# Patient Record
Sex: FEMALE | Race: WHITE | NOT HISPANIC OR LATINO | URBAN - METROPOLITAN AREA
[De-identification: names, ages, dates, MRNs, and addresses within clinical notes are randomized per-mention and may not be internally consistent; named-entity substitution may affect disease eponyms.]

---

## 2018-02-03 ENCOUNTER — EMERGENCY (EMERGENCY)
Facility: HOSPITAL | Age: 28
LOS: 1 days | Discharge: HOME | End: 2018-02-03
Attending: EMERGENCY MEDICINE | Admitting: EMERGENCY MEDICINE
Payer: COMMERCIAL

## 2018-02-03 VITALS
DIASTOLIC BLOOD PRESSURE: 68 MMHG | OXYGEN SATURATION: 100 % | HEART RATE: 82 BPM | WEIGHT: 145.06 LBS | SYSTOLIC BLOOD PRESSURE: 122 MMHG | TEMPERATURE: 97 F | RESPIRATION RATE: 17 BRPM

## 2018-02-03 DIAGNOSIS — F10.120 ALCOHOL ABUSE WITH INTOXICATION, UNCOMPLICATED: ICD-10-CM

## 2018-02-03 DIAGNOSIS — K52.9 NONINFECTIVE GASTROENTERITIS AND COLITIS, UNSPECIFIED: ICD-10-CM

## 2018-02-03 DIAGNOSIS — R11.10 VOMITING, UNSPECIFIED: ICD-10-CM

## 2018-02-03 DIAGNOSIS — Z79.899 OTHER LONG TERM (CURRENT) DRUG THERAPY: ICD-10-CM

## 2018-02-03 DIAGNOSIS — Z79.84 LONG TERM (CURRENT) USE OF ORAL HYPOGLYCEMIC DRUGS: ICD-10-CM

## 2018-02-03 LAB
ALBUMIN SERPL ELPH-MCNC: 4.5 G/DL — SIGNIFICANT CHANGE UP (ref 3.4–5)
ALP SERPL-CCNC: 73 U/L — SIGNIFICANT CHANGE UP (ref 40–120)
ALT FLD-CCNC: 23 U/L — SIGNIFICANT CHANGE UP (ref 12–42)
AMYLASE P1 CFR SERPL: 42 U/L — SIGNIFICANT CHANGE UP (ref 25–115)
ANION GAP SERPL CALC-SCNC: 15 MMOL/L — SIGNIFICANT CHANGE UP (ref 9–16)
APPEARANCE UR: CLEAR — SIGNIFICANT CHANGE UP
AST SERPL-CCNC: 20 U/L — SIGNIFICANT CHANGE UP (ref 15–37)
BACTERIA # UR AUTO: PRESENT /HPF
BILIRUB SERPL-MCNC: 2.3 MG/DL — HIGH (ref 0.2–1.2)
BILIRUB UR-MCNC: NEGATIVE — SIGNIFICANT CHANGE UP
BUN SERPL-MCNC: 15 MG/DL — SIGNIFICANT CHANGE UP (ref 7–23)
CALCIUM SERPL-MCNC: 9.6 MG/DL — SIGNIFICANT CHANGE UP (ref 8.5–10.5)
CHLORIDE SERPL-SCNC: 106 MMOL/L — SIGNIFICANT CHANGE UP (ref 96–108)
CO2 SERPL-SCNC: 23 MMOL/L — SIGNIFICANT CHANGE UP (ref 22–31)
COLOR SPEC: YELLOW — SIGNIFICANT CHANGE UP
CREAT SERPL-MCNC: 0.73 MG/DL — SIGNIFICANT CHANGE UP (ref 0.5–1.3)
DIFF PNL FLD: (no result)
EPI CELLS # UR: SIGNIFICANT CHANGE UP /HPF
ETHANOL SERPL-MCNC: 119 MG/DL — HIGH
GLUCOSE SERPL-MCNC: 91 MG/DL — SIGNIFICANT CHANGE UP (ref 70–99)
GLUCOSE UR QL: NEGATIVE — SIGNIFICANT CHANGE UP
HCG UR QL: NEGATIVE — SIGNIFICANT CHANGE UP
HCT VFR BLD CALC: 39.3 % — SIGNIFICANT CHANGE UP (ref 34.5–45)
HGB BLD-MCNC: 14 G/DL — SIGNIFICANT CHANGE UP (ref 11.5–15.5)
KETONES UR-MCNC: NEGATIVE — SIGNIFICANT CHANGE UP
LEUKOCYTE ESTERASE UR-ACNC: NEGATIVE — SIGNIFICANT CHANGE UP
LIDOCAIN IGE QN: 82 U/L — SIGNIFICANT CHANGE UP (ref 73–393)
MCHC RBC-ENTMCNC: 31.7 PG — SIGNIFICANT CHANGE UP (ref 27–34)
MCHC RBC-ENTMCNC: 35.6 G/DL — SIGNIFICANT CHANGE UP (ref 32–36)
MCV RBC AUTO: 89.1 FL — SIGNIFICANT CHANGE UP (ref 80–100)
NITRITE UR-MCNC: NEGATIVE — SIGNIFICANT CHANGE UP
PH UR: 5.5 — SIGNIFICANT CHANGE UP (ref 5–8)
PLATELET # BLD AUTO: 260 K/UL — SIGNIFICANT CHANGE UP (ref 150–400)
POTASSIUM SERPL-MCNC: 3.6 MMOL/L — SIGNIFICANT CHANGE UP (ref 3.5–5.3)
POTASSIUM SERPL-SCNC: 3.6 MMOL/L — SIGNIFICANT CHANGE UP (ref 3.5–5.3)
PROT SERPL-MCNC: 7.9 G/DL — SIGNIFICANT CHANGE UP (ref 6.4–8.2)
PROT UR-MCNC: NEGATIVE MG/DL — SIGNIFICANT CHANGE UP
RBC # BLD: 4.41 M/UL — SIGNIFICANT CHANGE UP (ref 3.8–5.2)
RBC # FLD: 12.3 % — SIGNIFICANT CHANGE UP (ref 10.3–16.9)
RBC CASTS # UR COMP ASSIST: (no result) /HPF
SODIUM SERPL-SCNC: 144 MMOL/L — SIGNIFICANT CHANGE UP (ref 132–145)
SP GR SPEC: 1.02 — SIGNIFICANT CHANGE UP (ref 1–1.03)
UROBILINOGEN FLD QL: 0.2 E.U./DL — SIGNIFICANT CHANGE UP
WBC # BLD: 5.3 K/UL — SIGNIFICANT CHANGE UP (ref 3.8–10.5)
WBC # FLD AUTO: 5.3 K/UL — SIGNIFICANT CHANGE UP (ref 3.8–10.5)
WBC UR QL: < 5 /HPF — SIGNIFICANT CHANGE UP

## 2018-02-03 PROCEDURE — 99284 EMERGENCY DEPT VISIT MOD MDM: CPT | Mod: 25

## 2018-02-03 RX ORDER — FAMOTIDINE 10 MG/ML
20 INJECTION INTRAVENOUS ONCE
Qty: 0 | Refills: 0 | Status: COMPLETED | OUTPATIENT
Start: 2018-02-03 | End: 2018-02-03

## 2018-02-03 RX ORDER — SODIUM CHLORIDE 9 MG/ML
1000 INJECTION INTRAMUSCULAR; INTRAVENOUS; SUBCUTANEOUS ONCE
Qty: 0 | Refills: 0 | Status: COMPLETED | OUTPATIENT
Start: 2018-02-03 | End: 2018-02-03

## 2018-02-03 RX ORDER — ONDANSETRON 8 MG/1
4 TABLET, FILM COATED ORAL ONCE
Qty: 0 | Refills: 0 | Status: COMPLETED | OUTPATIENT
Start: 2018-02-03 | End: 2018-02-03

## 2018-02-03 RX ADMIN — SODIUM CHLORIDE 1000 MILLILITER(S): 9 INJECTION INTRAMUSCULAR; INTRAVENOUS; SUBCUTANEOUS at 23:33

## 2018-02-03 RX ADMIN — FAMOTIDINE 20 MILLIGRAM(S): 10 INJECTION INTRAVENOUS at 23:33

## 2018-02-03 RX ADMIN — ONDANSETRON 4 MILLIGRAM(S): 8 TABLET, FILM COATED ORAL at 23:33

## 2018-02-03 NOTE — ED ADULT TRIAGE NOTE - CHIEF COMPLAINT QUOTE
pt came in ambulatory, with complaint of persistent vomiting which started about 3 hours ago, also associated with nausea. Denies abd pain, diarrhea, constipation.

## 2018-02-04 VITALS
DIASTOLIC BLOOD PRESSURE: 59 MMHG | HEART RATE: 76 BPM | RESPIRATION RATE: 16 BRPM | SYSTOLIC BLOOD PRESSURE: 126 MMHG | TEMPERATURE: 98 F | OXYGEN SATURATION: 100 %

## 2018-02-04 RX ORDER — ONDANSETRON 8 MG/1
1 TABLET, FILM COATED ORAL
Qty: 12 | Refills: 0
Start: 2018-02-04 | End: 2018-02-06

## 2018-02-04 NOTE — ED ADULT NURSE NOTE - OBJECTIVE STATEMENT
c/p vomiting today after drinks 2 drinks at bar tonight; c/o lower abd pain this morning but none now

## 2018-02-04 NOTE — ED PROVIDER NOTE - CARE PLAN
Principal Discharge DX:	Gastroenteritis  Secondary Diagnosis:	Alcoholic intoxication without complication

## 2018-02-04 NOTE — ED PROVIDER NOTE - OBJECTIVE STATEMENT
28 yo female c/o sudden onset n/v while drinking alcohol at  bar. denies abd pain, c/o nausea currently. lmp 1 week ago.

## 2018-02-04 NOTE — ED ADULT NURSE NOTE - PATIENT DISCHARGE SIGNATURE
Detail Level: Simple Other (Free Text): Resolved with ln2 Note Text (......Xxx Chief Complaint.): This diagnosis correlates with the 04-Feb-2018

## 2018-04-15 ENCOUNTER — TRANSCRIPTION ENCOUNTER (OUTPATIENT)
Age: 28
End: 2018-04-15

## 2018-12-02 ENCOUNTER — EMERGENCY (EMERGENCY)
Facility: HOSPITAL | Age: 28
LOS: 1 days | Discharge: HOME | End: 2018-12-02
Attending: EMERGENCY MEDICINE | Admitting: EMERGENCY MEDICINE
Payer: COMMERCIAL

## 2018-12-02 VITALS
TEMPERATURE: 98 F | RESPIRATION RATE: 19 BRPM | OXYGEN SATURATION: 97 % | WEIGHT: 149.91 LBS | DIASTOLIC BLOOD PRESSURE: 90 MMHG | HEART RATE: 96 BPM | SYSTOLIC BLOOD PRESSURE: 144 MMHG

## 2018-12-02 VITALS
SYSTOLIC BLOOD PRESSURE: 120 MMHG | OXYGEN SATURATION: 99 % | TEMPERATURE: 98 F | RESPIRATION RATE: 14 BRPM | DIASTOLIC BLOOD PRESSURE: 68 MMHG | HEART RATE: 79 BPM

## 2018-12-02 LAB
ALBUMIN SERPL ELPH-MCNC: 4.7 G/DL — SIGNIFICANT CHANGE UP (ref 3.4–5)
ALP SERPL-CCNC: 77 U/L — SIGNIFICANT CHANGE UP (ref 40–120)
ALT FLD-CCNC: 30 U/L — SIGNIFICANT CHANGE UP (ref 12–42)
ANION GAP SERPL CALC-SCNC: 12 MMOL/L — SIGNIFICANT CHANGE UP (ref 9–16)
AST SERPL-CCNC: 35 U/L — SIGNIFICANT CHANGE UP (ref 15–37)
BASOPHILS NFR BLD AUTO: 0.3 % — SIGNIFICANT CHANGE UP (ref 0–2)
BILIRUB SERPL-MCNC: 2.4 MG/DL — HIGH (ref 0.2–1.2)
BUN SERPL-MCNC: 9 MG/DL — SIGNIFICANT CHANGE UP (ref 7–23)
CALCIUM SERPL-MCNC: 10 MG/DL — SIGNIFICANT CHANGE UP (ref 8.5–10.5)
CHLORIDE SERPL-SCNC: 101 MMOL/L — SIGNIFICANT CHANGE UP (ref 96–108)
CO2 SERPL-SCNC: 26 MMOL/L — SIGNIFICANT CHANGE UP (ref 22–31)
CREAT SERPL-MCNC: 0.83 MG/DL — SIGNIFICANT CHANGE UP (ref 0.5–1.3)
EOSINOPHIL NFR BLD AUTO: 0.7 % — SIGNIFICANT CHANGE UP (ref 0–6)
GLUCOSE SERPL-MCNC: 96 MG/DL — SIGNIFICANT CHANGE UP (ref 70–99)
HCT VFR BLD CALC: 39.7 % — SIGNIFICANT CHANGE UP (ref 34.5–45)
HGB BLD-MCNC: 14.8 G/DL — SIGNIFICANT CHANGE UP (ref 11.5–15.5)
IMM GRANULOCYTES NFR BLD AUTO: 0.3 % — SIGNIFICANT CHANGE UP (ref 0–1.5)
LYMPHOCYTES # BLD AUTO: 13.5 % — SIGNIFICANT CHANGE UP (ref 13–44)
MCHC RBC-ENTMCNC: 32.2 PG — SIGNIFICANT CHANGE UP (ref 27–34)
MCHC RBC-ENTMCNC: 37.3 G/DL — HIGH (ref 32–36)
MCV RBC AUTO: 86.3 FL — SIGNIFICANT CHANGE UP (ref 80–100)
MONOCYTES NFR BLD AUTO: 7.3 % — SIGNIFICANT CHANGE UP (ref 2–14)
NEUTROPHILS NFR BLD AUTO: 77.9 % — HIGH (ref 43–77)
PLATELET # BLD AUTO: 285 K/UL — SIGNIFICANT CHANGE UP (ref 150–400)
POTASSIUM SERPL-MCNC: 3.4 MMOL/L — LOW (ref 3.5–5.3)
POTASSIUM SERPL-SCNC: 3.4 MMOL/L — LOW (ref 3.5–5.3)
PROT SERPL-MCNC: 8.1 G/DL — SIGNIFICANT CHANGE UP (ref 6.4–8.2)
RBC # BLD: 4.6 M/UL — SIGNIFICANT CHANGE UP (ref 3.8–5.2)
RBC # FLD: 12.4 % — SIGNIFICANT CHANGE UP (ref 10.3–14.5)
SODIUM SERPL-SCNC: 139 MMOL/L — SIGNIFICANT CHANGE UP (ref 132–145)
WBC # BLD: 10.2 K/UL — SIGNIFICANT CHANGE UP (ref 3.8–10.5)
WBC # FLD AUTO: 10.2 K/UL — SIGNIFICANT CHANGE UP (ref 3.8–10.5)

## 2018-12-02 PROCEDURE — 99284 EMERGENCY DEPT VISIT MOD MDM: CPT

## 2018-12-02 RX ORDER — PHENAZOPYRIDINE HCL 100 MG
200 TABLET ORAL ONCE
Qty: 0 | Refills: 0 | Status: COMPLETED | OUTPATIENT
Start: 2018-12-02 | End: 2018-12-02

## 2018-12-02 RX ORDER — PHENAZOPYRIDINE HCL 100 MG
1 TABLET ORAL
Qty: 6 | Refills: 0 | OUTPATIENT
Start: 2018-12-02 | End: 2018-12-03

## 2018-12-02 RX ORDER — CEFTRIAXONE 500 MG/1
1 INJECTION, POWDER, FOR SOLUTION INTRAMUSCULAR; INTRAVENOUS ONCE
Qty: 0 | Refills: 0 | Status: COMPLETED | OUTPATIENT
Start: 2018-12-02 | End: 2018-12-02

## 2018-12-02 RX ORDER — SODIUM CHLORIDE 9 MG/ML
1000 INJECTION INTRAMUSCULAR; INTRAVENOUS; SUBCUTANEOUS ONCE
Qty: 0 | Refills: 0 | Status: COMPLETED | OUTPATIENT
Start: 2018-12-02 | End: 2018-12-02

## 2018-12-02 RX ORDER — CEFPODOXIME PROXETIL 100 MG
1 TABLET ORAL
Qty: 14 | Refills: 0
Start: 2018-12-02 | End: 2018-12-08

## 2018-12-02 RX ADMIN — Medication 200 MILLIGRAM(S): at 16:27

## 2018-12-02 RX ADMIN — CEFTRIAXONE 1 GRAM(S): 500 INJECTION, POWDER, FOR SOLUTION INTRAMUSCULAR; INTRAVENOUS at 17:16

## 2018-12-02 RX ADMIN — SODIUM CHLORIDE 2000 MILLILITER(S): 9 INJECTION INTRAMUSCULAR; INTRAVENOUS; SUBCUTANEOUS at 16:27

## 2018-12-02 RX ADMIN — CEFTRIAXONE 100 GRAM(S): 500 INJECTION, POWDER, FOR SOLUTION INTRAMUSCULAR; INTRAVENOUS at 17:16

## 2018-12-02 RX ADMIN — SODIUM CHLORIDE 1000 MILLILITER(S): 9 INJECTION INTRAMUSCULAR; INTRAVENOUS; SUBCUTANEOUS at 17:16

## 2018-12-02 NOTE — ED PROVIDER NOTE - PHYSICAL EXAMINATION
CON: ao x 3, HENMT: clear oropharynx, soft neck, HEAD: atraumatic, GI: +BS, soft, nontender, no rebound, no guarding, GUL no cvat, SKIN: no rash, MSK: no deformities, NEURO: no gross motor or sensory deficit

## 2018-12-04 ENCOUNTER — EMERGENCY (EMERGENCY)
Facility: HOSPITAL | Age: 28
LOS: 1 days | Discharge: HOME | End: 2018-12-04
Admitting: EMERGENCY MEDICINE
Payer: COMMERCIAL

## 2018-12-04 VITALS
SYSTOLIC BLOOD PRESSURE: 117 MMHG | OXYGEN SATURATION: 96 % | TEMPERATURE: 98 F | WEIGHT: 149.91 LBS | HEART RATE: 78 BPM | DIASTOLIC BLOOD PRESSURE: 81 MMHG | RESPIRATION RATE: 15 BRPM

## 2018-12-04 LAB
APPEARANCE UR: SIGNIFICANT CHANGE UP
BILIRUB UR-MCNC: NEGATIVE — SIGNIFICANT CHANGE UP
COLOR SPEC: ABNORMAL
DIFF PNL FLD: NEGATIVE — SIGNIFICANT CHANGE UP
GLUCOSE UR QL: 100
HCG UR QL: NEGATIVE — SIGNIFICANT CHANGE UP
KETONES UR-MCNC: NEGATIVE — SIGNIFICANT CHANGE UP
LEUKOCYTE ESTERASE UR-ACNC: ABNORMAL
NITRITE UR-MCNC: POSITIVE
PH UR: 5 — SIGNIFICANT CHANGE UP (ref 5–8)
PROT UR-MCNC: NEGATIVE MG/DL — SIGNIFICANT CHANGE UP
SP GR SPEC: >=1.03 — SIGNIFICANT CHANGE UP (ref 1–1.03)
UROBILINOGEN FLD QL: 1 E.U./DL — SIGNIFICANT CHANGE UP

## 2018-12-04 PROCEDURE — 99283 EMERGENCY DEPT VISIT LOW MDM: CPT

## 2018-12-04 RX ORDER — AZTREONAM 2 G
1 VIAL (EA) INJECTION
Qty: 14 | Refills: 0
Start: 2018-12-04 | End: 2018-12-10

## 2018-12-04 RX ADMIN — Medication 1 TABLET(S): at 20:00

## 2018-12-04 NOTE — ED PROVIDER NOTE - OBJECTIVE STATEMENT
27 y/o F p/w worsening UTI sx's for this past week. Was seen here 2 days ago for UTI and prescribed cefpodoxime however now has worsening dysuria, frequency and suprapubic pressure despite ABX. Slight back discomfort. Requesting different ABX. No f/c, weakness, N/V/D, abnormal vaginal bleeding or d/c.

## 2018-12-04 NOTE — ED PROVIDER NOTE - MEDICAL DECISION MAKING DETAILS
UA now has + Nitrites. No growth on previous urine Cx as of this time. Will prescribe Bactrim DS. F/U with PMD in 24-48 hours Strict return precautions reviewed with pt in which pt verbalizes understanding and agrees to.

## 2018-12-05 LAB
CULTURE RESULTS: NO GROWTH — SIGNIFICANT CHANGE UP
SPECIMEN SOURCE: SIGNIFICANT CHANGE UP

## 2018-12-06 DIAGNOSIS — Z79.899 OTHER LONG TERM (CURRENT) DRUG THERAPY: ICD-10-CM

## 2018-12-06 DIAGNOSIS — N30.00 ACUTE CYSTITIS WITHOUT HEMATURIA: ICD-10-CM

## 2018-12-06 DIAGNOSIS — R30.0 DYSURIA: ICD-10-CM

## 2018-12-06 DIAGNOSIS — Z79.84 LONG TERM (CURRENT) USE OF ORAL HYPOGLYCEMIC DRUGS: ICD-10-CM

## 2018-12-08 DIAGNOSIS — Z79.891 LONG TERM (CURRENT) USE OF OPIATE ANALGESIC: ICD-10-CM

## 2018-12-08 DIAGNOSIS — Z79.2 LONG TERM (CURRENT) USE OF ANTIBIOTICS: ICD-10-CM

## 2018-12-08 DIAGNOSIS — Z79.84 LONG TERM (CURRENT) USE OF ORAL HYPOGLYCEMIC DRUGS: ICD-10-CM

## 2018-12-08 DIAGNOSIS — Z79.899 OTHER LONG TERM (CURRENT) DRUG THERAPY: ICD-10-CM

## 2018-12-08 DIAGNOSIS — R30.0 DYSURIA: ICD-10-CM

## 2018-12-08 DIAGNOSIS — N39.0 URINARY TRACT INFECTION, SITE NOT SPECIFIED: ICD-10-CM

## 2019-03-01 ENCOUNTER — EMERGENCY (EMERGENCY)
Facility: HOSPITAL | Age: 29
LOS: 1 days | Discharge: HOME | End: 2019-03-01
Admitting: EMERGENCY MEDICINE
Payer: COMMERCIAL

## 2019-03-01 VITALS
DIASTOLIC BLOOD PRESSURE: 80 MMHG | RESPIRATION RATE: 18 BRPM | TEMPERATURE: 98 F | HEART RATE: 104 BPM | SYSTOLIC BLOOD PRESSURE: 148 MMHG | OXYGEN SATURATION: 99 %

## 2019-03-01 VITALS
HEART RATE: 76 BPM | SYSTOLIC BLOOD PRESSURE: 116 MMHG | OXYGEN SATURATION: 99 % | RESPIRATION RATE: 18 BRPM | DIASTOLIC BLOOD PRESSURE: 76 MMHG | TEMPERATURE: 99 F

## 2019-03-01 DIAGNOSIS — R30.0 DYSURIA: ICD-10-CM

## 2019-03-01 DIAGNOSIS — Z79.2 LONG TERM (CURRENT) USE OF ANTIBIOTICS: ICD-10-CM

## 2019-03-01 DIAGNOSIS — Z79.84 LONG TERM (CURRENT) USE OF ORAL HYPOGLYCEMIC DRUGS: ICD-10-CM

## 2019-03-01 DIAGNOSIS — Z79.899 OTHER LONG TERM (CURRENT) DRUG THERAPY: ICD-10-CM

## 2019-03-01 DIAGNOSIS — N30.00 ACUTE CYSTITIS WITHOUT HEMATURIA: ICD-10-CM

## 2019-03-01 LAB
APPEARANCE UR: CLEAR — SIGNIFICANT CHANGE UP
BILIRUB UR-MCNC: NEGATIVE — SIGNIFICANT CHANGE UP
COLOR SPEC: YELLOW — SIGNIFICANT CHANGE UP
DIFF PNL FLD: ABNORMAL
GLUCOSE UR QL: NEGATIVE — SIGNIFICANT CHANGE UP
HCG UR QL: NEGATIVE — SIGNIFICANT CHANGE UP
KETONES UR-MCNC: NEGATIVE — SIGNIFICANT CHANGE UP
LEUKOCYTE ESTERASE UR-ACNC: ABNORMAL
NITRITE UR-MCNC: NEGATIVE — SIGNIFICANT CHANGE UP
PH UR: 6 — SIGNIFICANT CHANGE UP (ref 5–8)
PROT UR-MCNC: NEGATIVE MG/DL — SIGNIFICANT CHANGE UP
SP GR SPEC: <=1.005 — SIGNIFICANT CHANGE UP (ref 1–1.03)
UROBILINOGEN FLD QL: 0.2 E.U./DL — SIGNIFICANT CHANGE UP

## 2019-03-01 PROCEDURE — 99283 EMERGENCY DEPT VISIT LOW MDM: CPT

## 2019-03-01 RX ORDER — IBUPROFEN 200 MG
600 TABLET ORAL ONCE
Qty: 0 | Refills: 0 | Status: COMPLETED | OUTPATIENT
Start: 2019-03-01 | End: 2019-03-01

## 2019-03-01 RX ORDER — PHENAZOPYRIDINE HCL 100 MG
1 TABLET ORAL
Qty: 10 | Refills: 0
Start: 2019-03-01 | End: 2019-03-03

## 2019-03-01 RX ORDER — CEFUROXIME AXETIL 250 MG
1 TABLET ORAL
Qty: 13 | Refills: 0
Start: 2019-03-01 | End: 2019-03-07

## 2019-03-01 RX ORDER — CEFUROXIME AXETIL 250 MG
250 TABLET ORAL ONCE
Qty: 0 | Refills: 0 | Status: COMPLETED | OUTPATIENT
Start: 2019-03-01 | End: 2019-03-01

## 2019-03-01 RX ORDER — PHENAZOPYRIDINE HCL 100 MG
200 TABLET ORAL ONCE
Qty: 0 | Refills: 0 | Status: COMPLETED | OUTPATIENT
Start: 2019-03-01 | End: 2019-03-01

## 2019-03-01 RX ADMIN — Medication 250 MILLIGRAM(S): at 22:09

## 2019-03-01 RX ADMIN — Medication 200 MILLIGRAM(S): at 20:32

## 2019-03-01 RX ADMIN — Medication 600 MILLIGRAM(S): at 20:32

## 2019-03-01 NOTE — ED PROVIDER NOTE - OBJECTIVE STATEMENT
27 y/o F presents to ED c/o dysuria and pressure x 3 hours pta.  Pt states her symptoms have been constant since onset and are very similar to past UTIs.  She denies fevers/chills, vomiting, abd pain, back pain, hematuria.  Pt states she took Azo, a natural urinary tract supplement and a left over dose of cephalexin 500 mg without relief.

## 2019-03-01 NOTE — ED PROVIDER NOTE - CARE PROVIDER_API CALL
Mukund Rodriguez)  Urology  [address] [place] [address], [address]  [place], [place] [id number]  Phone: [phone]  Fax: [phone]  Follow Up Time:

## 2019-03-01 NOTE — ED ADULT NURSE NOTE - OBJECTIVE STATEMENT
pt is a 27 y/o F who comes into the ed c/o sudden onset, severe Urinary symptoms since 5 pm. pt denies fevers, chills, nausea, vomiting. pt reports urinary frequency and pain with urination, pt reports she took an old antibiotic.

## 2019-03-01 NOTE — ED PROVIDER NOTE - CLINICAL SUMMARY MEDICAL DECISION MAKING FREE TEXT BOX
29 y/o F presents to ED c/o dysuria x 3 hours.  Pt well appearing. VSS. Pt given Pyridium and ibuprofen.  UA and urine culture sent. 27 y/o F presents to ED c/o dysuria x 3 hours.  Pt well appearing. VSS. Pt given Pyridium and ibuprofen.  UA and urine culture sent.  Pt noted to have multiple visits for UTIs with negative urine cultures.  This was discussed with pt.  Pt advised to take antibiotic as prescribed until complete.  Return precautions advised.

## 2019-03-01 NOTE — ED PROVIDER NOTE - NSFOLLOWUPINSTRUCTIONS_ED_ALL_ED_FT
Follow up with urology.  Take antibiotic as prescribed until complete.  Take Pyridium as needed for pain.  Hydrate well.  Be sure to void after intercourse.

## 2019-03-03 LAB
CULTURE RESULTS: SIGNIFICANT CHANGE UP
SPECIMEN SOURCE: SIGNIFICANT CHANGE UP

## 2019-06-25 ENCOUNTER — EMERGENCY (EMERGENCY)
Facility: HOSPITAL | Age: 29
LOS: 1 days | Discharge: HOME | End: 2019-06-25
Admitting: EMERGENCY MEDICINE
Payer: COMMERCIAL

## 2019-06-25 VITALS
SYSTOLIC BLOOD PRESSURE: 154 MMHG | TEMPERATURE: 99 F | DIASTOLIC BLOOD PRESSURE: 92 MMHG | HEART RATE: 98 BPM | RESPIRATION RATE: 18 BRPM | OXYGEN SATURATION: 96 %

## 2019-06-25 LAB
APPEARANCE UR: CLEAR — SIGNIFICANT CHANGE UP
BILIRUB UR-MCNC: NEGATIVE — SIGNIFICANT CHANGE UP
COLOR SPEC: ABNORMAL
DIFF PNL FLD: NEGATIVE — SIGNIFICANT CHANGE UP
GLUCOSE UR QL: NEGATIVE — SIGNIFICANT CHANGE UP
HCG UR QL: NEGATIVE — SIGNIFICANT CHANGE UP
KETONES UR-MCNC: NEGATIVE — SIGNIFICANT CHANGE UP
LEUKOCYTE ESTERASE UR-ACNC: ABNORMAL
NITRITE UR-MCNC: POSITIVE
PH UR: 6 — SIGNIFICANT CHANGE UP (ref 5–8)
PROT UR-MCNC: NEGATIVE MG/DL — SIGNIFICANT CHANGE UP
SP GR SPEC: <=1.005 — SIGNIFICANT CHANGE UP (ref 1–1.03)
UROBILINOGEN FLD QL: 1 E.U./DL — SIGNIFICANT CHANGE UP

## 2019-06-25 PROCEDURE — 99283 EMERGENCY DEPT VISIT LOW MDM: CPT | Mod: 25

## 2019-06-26 RX ORDER — IBUPROFEN 200 MG
600 TABLET ORAL ONCE
Refills: 0 | Status: COMPLETED | OUTPATIENT
Start: 2019-06-26 | End: 2019-06-26

## 2019-06-26 RX ORDER — PHENAZOPYRIDINE HCL 100 MG
1 TABLET ORAL
Qty: 6 | Refills: 0
Start: 2019-06-26 | End: 2019-06-27

## 2019-06-26 RX ORDER — PHENAZOPYRIDINE HCL 100 MG
200 TABLET ORAL ONCE
Refills: 0 | Status: COMPLETED | OUTPATIENT
Start: 2019-06-26 | End: 2019-06-26

## 2019-06-26 RX ORDER — CEFUROXIME AXETIL 250 MG
250 TABLET ORAL ONCE
Refills: 0 | Status: COMPLETED | OUTPATIENT
Start: 2019-06-26 | End: 2019-06-26

## 2019-06-26 RX ORDER — CEFUROXIME AXETIL 250 MG
1 TABLET ORAL
Qty: 14 | Refills: 0
Start: 2019-06-26 | End: 2019-07-02

## 2019-06-26 RX ADMIN — Medication 250 MILLIGRAM(S): at 00:38

## 2019-06-26 RX ADMIN — Medication 600 MILLIGRAM(S): at 00:37

## 2019-06-26 RX ADMIN — Medication 200 MILLIGRAM(S): at 00:37

## 2019-06-26 NOTE — ED PROVIDER NOTE - OBJECTIVE STATEMENT
28 year old female with no PMhx presents with dysuria and frequency x 2 hours. patient reports feels like "UTI." no back pain. no vaginal discharge.   sexually active, no concern for STD, no vaginal discharge.   Denies fever, chills, N/V/D/C, melena, hematochezia, change in bowel function, vaginal bleeding, d/c, flank pain, HA, dizziness, focal weakness, CP, SOB, palpitations, cough, and malaise.

## 2019-06-26 NOTE — ED PROVIDER NOTE - CARE PROVIDER_API CALL
Alejandrina [place] ()  [place] [address]  Phone: [phone]  Fax: [phone]  Follow Up Time:

## 2019-06-26 NOTE — ED PROVIDER NOTE - PROGRESS NOTE DETAILS
ua reviewed and discussed with patient. shows infection will send cx and treat with ceftin. AFVSS at time of d/c, pt non-toxic appearing, results, ddx, and f/u plans discussed with pt at bedside, d/c'd home to f/u with PMD, strict return precautions discussed, prompt return to ER for any worsening or new sx, pt verbalized understanding.

## 2019-06-26 NOTE — ED PROVIDER NOTE - NSFOLLOWUPINSTRUCTIONS_ED_ALL_ED_FT
take antibiotics as prescribed. take motrin/tylenol for discomfort.      A urinary tract infection (UTI) is an infection of any part of the urinary tract, which includes the kidneys, ureters, bladder, and urethra. Risk factors include ignoring your need to urinate, wiping back to front if female, being an uncircumcised male, and having diabetes or a weak immune system. Symptoms include frequent urination, pain or burning with urination, foul smelling urine, cloudy urine, pain in the lower abdomen, blood in the urine, and fever. If you were prescribed an antibiotic medicine, take it as told by your health care provider. Do not stop taking the antibiotic even if you start to feel better.    SEEK IMMEDIATE MEDICAL CARE IF YOU HAVE ANY OF THE FOLLOWING SYMPTOMS: severe back or abdominal pain, fever, inability to keep fluids or medicine down, dizziness/lightheadedness, or a change in mental status.

## 2019-06-26 NOTE — ED PROVIDER NOTE - NS ED ROS FT
· CONSTITUTIONAL: no fever and no chills.  · CARDIOVASCULAR: normal rate and rhythm, no chest pain and no edema.  · RESPIRATORY: no chest pain, no cough, and no shortness of breath.  · GASTROINTESTINAL: no abdominal pain, no bloating, no constipation, no diarrhea, no nausea and no vomiting.  - : +dysuria  · MUSCULOSKELETAL: no back pain, no musculoskeletal pain, no neck pain, and no weakness.  · SKIN: no abrasions, no jaundice, no lesions, no pruritis, and no rashes.  · NEURO: no loss of consciousness, no gait abnormality, no headache, no sensory deficits, and no weakness.  · PSYCHIATRIC: no known mental health issues.

## 2019-06-26 NOTE — ED PROVIDER NOTE - CLINICAL SUMMARY MEDICAL DECISION MAKING FREE TEXT BOX
dysuria and frequency x 2 hours. UA positive. will treat with antibiotics and send cx. no concern for pyelo or kidney stone at this time. no back pain. well appearing, nontoxic

## 2019-06-29 DIAGNOSIS — R30.0 DYSURIA: ICD-10-CM

## 2019-06-29 DIAGNOSIS — N39.0 URINARY TRACT INFECTION, SITE NOT SPECIFIED: ICD-10-CM

## 2019-06-29 DIAGNOSIS — Z79.899 OTHER LONG TERM (CURRENT) DRUG THERAPY: ICD-10-CM

## 2019-09-14 ENCOUNTER — EMERGENCY (EMERGENCY)
Facility: HOSPITAL | Age: 29
LOS: 1 days | Discharge: HOME | End: 2019-09-14
Attending: EMERGENCY MEDICINE | Admitting: EMERGENCY MEDICINE
Payer: COMMERCIAL

## 2019-09-14 VITALS
SYSTOLIC BLOOD PRESSURE: 140 MMHG | HEART RATE: 105 BPM | RESPIRATION RATE: 16 BRPM | DIASTOLIC BLOOD PRESSURE: 96 MMHG | OXYGEN SATURATION: 97 % | TEMPERATURE: 98 F

## 2019-09-14 DIAGNOSIS — N30.00 ACUTE CYSTITIS WITHOUT HEMATURIA: ICD-10-CM

## 2019-09-14 DIAGNOSIS — R10.31 RIGHT LOWER QUADRANT PAIN: ICD-10-CM

## 2019-09-14 LAB
ALBUMIN SERPL ELPH-MCNC: 4.6 G/DL — SIGNIFICANT CHANGE UP (ref 3.4–5)
ALP SERPL-CCNC: 83 U/L — SIGNIFICANT CHANGE UP (ref 40–120)
ALT FLD-CCNC: 15 U/L — SIGNIFICANT CHANGE UP (ref 12–42)
ANION GAP SERPL CALC-SCNC: 8 MMOL/L — LOW (ref 9–16)
APPEARANCE UR: CLEAR — SIGNIFICANT CHANGE UP
AST SERPL-CCNC: 20 U/L — SIGNIFICANT CHANGE UP (ref 15–37)
BASOPHILS NFR BLD AUTO: 0.6 % — SIGNIFICANT CHANGE UP (ref 0–2)
BILIRUB SERPL-MCNC: 1.1 MG/DL — SIGNIFICANT CHANGE UP (ref 0.2–1.2)
BILIRUB UR-MCNC: NEGATIVE — SIGNIFICANT CHANGE UP
BUN SERPL-MCNC: 14 MG/DL — SIGNIFICANT CHANGE UP (ref 7–23)
CALCIUM SERPL-MCNC: 9.4 MG/DL — SIGNIFICANT CHANGE UP (ref 8.5–10.5)
CHLORIDE SERPL-SCNC: 106 MMOL/L — SIGNIFICANT CHANGE UP (ref 96–108)
CO2 SERPL-SCNC: 29 MMOL/L — SIGNIFICANT CHANGE UP (ref 22–31)
COLOR SPEC: YELLOW — SIGNIFICANT CHANGE UP
CREAT SERPL-MCNC: 1.1 MG/DL — SIGNIFICANT CHANGE UP (ref 0.5–1.3)
DIFF PNL FLD: NEGATIVE — SIGNIFICANT CHANGE UP
EOSINOPHIL NFR BLD AUTO: 3.1 % — SIGNIFICANT CHANGE UP (ref 0–6)
GLUCOSE SERPL-MCNC: 92 MG/DL — SIGNIFICANT CHANGE UP (ref 70–99)
GLUCOSE UR QL: 100
HCG UR QL: NEGATIVE — SIGNIFICANT CHANGE UP
HCT VFR BLD CALC: 39.6 % — SIGNIFICANT CHANGE UP (ref 34.5–45)
HGB BLD-MCNC: 14.4 G/DL — SIGNIFICANT CHANGE UP (ref 11.5–15.5)
IMM GRANULOCYTES NFR BLD AUTO: 0.4 % — SIGNIFICANT CHANGE UP (ref 0–1.5)
KETONES UR-MCNC: NEGATIVE — SIGNIFICANT CHANGE UP
LEUKOCYTE ESTERASE UR-ACNC: ABNORMAL
LIDOCAIN IGE QN: 110 U/L — SIGNIFICANT CHANGE UP (ref 73–393)
LYMPHOCYTES # BLD AUTO: 32.4 % — SIGNIFICANT CHANGE UP (ref 13–44)
MCHC RBC-ENTMCNC: 31.3 PG — SIGNIFICANT CHANGE UP (ref 27–34)
MCHC RBC-ENTMCNC: 36.4 G/DL — HIGH (ref 32–36)
MCV RBC AUTO: 86.1 FL — SIGNIFICANT CHANGE UP (ref 80–100)
MONOCYTES NFR BLD AUTO: 7.2 % — SIGNIFICANT CHANGE UP (ref 2–14)
NEUTROPHILS NFR BLD AUTO: 56.3 % — SIGNIFICANT CHANGE UP (ref 43–77)
NITRITE UR-MCNC: POSITIVE
PH UR: 6 — SIGNIFICANT CHANGE UP (ref 5–8)
PLATELET # BLD AUTO: 327 K/UL — SIGNIFICANT CHANGE UP (ref 150–400)
POTASSIUM SERPL-MCNC: 3.9 MMOL/L — SIGNIFICANT CHANGE UP (ref 3.5–5.3)
POTASSIUM SERPL-SCNC: 3.9 MMOL/L — SIGNIFICANT CHANGE UP (ref 3.5–5.3)
PROT SERPL-MCNC: 7.8 G/DL — SIGNIFICANT CHANGE UP (ref 6.4–8.2)
PROT UR-MCNC: ABNORMAL MG/DL
RBC # BLD: 4.6 M/UL — SIGNIFICANT CHANGE UP (ref 3.8–5.2)
RBC # FLD: 11.9 % — SIGNIFICANT CHANGE UP (ref 10.3–14.5)
SODIUM SERPL-SCNC: 143 MMOL/L — SIGNIFICANT CHANGE UP (ref 132–145)
SP GR SPEC: <=1.005 — SIGNIFICANT CHANGE UP (ref 1–1.03)
UROBILINOGEN FLD QL: 1 E.U./DL — SIGNIFICANT CHANGE UP
WBC # BLD: 8.5 K/UL — SIGNIFICANT CHANGE UP (ref 3.8–10.5)
WBC # FLD AUTO: 8.5 K/UL — SIGNIFICANT CHANGE UP (ref 3.8–10.5)

## 2019-09-14 PROCEDURE — 99284 EMERGENCY DEPT VISIT MOD MDM: CPT

## 2019-09-14 RX ORDER — IOHEXOL 300 MG/ML
30 INJECTION, SOLUTION INTRAVENOUS ONCE
Refills: 0 | Status: COMPLETED | OUTPATIENT
Start: 2019-09-14 | End: 2019-09-14

## 2019-09-14 RX ORDER — ONDANSETRON 8 MG/1
4 TABLET, FILM COATED ORAL ONCE
Refills: 0 | Status: COMPLETED | OUTPATIENT
Start: 2019-09-14 | End: 2019-09-14

## 2019-09-14 RX ORDER — SODIUM CHLORIDE 9 MG/ML
1000 INJECTION INTRAMUSCULAR; INTRAVENOUS; SUBCUTANEOUS ONCE
Refills: 0 | Status: COMPLETED | OUTPATIENT
Start: 2019-09-14 | End: 2019-09-14

## 2019-09-14 RX ORDER — CEFTRIAXONE 500 MG/1
1000 INJECTION, POWDER, FOR SOLUTION INTRAMUSCULAR; INTRAVENOUS ONCE
Refills: 0 | Status: COMPLETED | OUTPATIENT
Start: 2019-09-14 | End: 2019-09-14

## 2019-09-14 RX ADMIN — ONDANSETRON 4 MILLIGRAM(S): 8 TABLET, FILM COATED ORAL at 21:30

## 2019-09-14 RX ADMIN — IOHEXOL 30 MILLILITER(S): 300 INJECTION, SOLUTION INTRAVENOUS at 22:58

## 2019-09-14 RX ADMIN — CEFTRIAXONE 100 MILLIGRAM(S): 500 INJECTION, POWDER, FOR SOLUTION INTRAMUSCULAR; INTRAVENOUS at 22:57

## 2019-09-14 RX ADMIN — SODIUM CHLORIDE 1000 MILLILITER(S): 9 INJECTION INTRAMUSCULAR; INTRAVENOUS; SUBCUTANEOUS at 21:31

## 2019-09-14 RX ADMIN — SODIUM CHLORIDE 1000 MILLILITER(S): 9 INJECTION INTRAMUSCULAR; INTRAVENOUS; SUBCUTANEOUS at 22:57

## 2019-09-14 NOTE — ED PROVIDER NOTE - CARE PROVIDER_API CALL
Kaylan Dejesus)  Urology  [address] [place] [address]  Phone: [phone]  Fax: [phone]  Follow Up Time:

## 2019-09-14 NOTE — ED ADULT TRIAGE NOTE - CHIEF COMPLAINT QUOTE
pt complains of suprapubic pain with urinary symptoms. vomiting on arrival, denies use of birth control.

## 2019-09-14 NOTE — ED PROVIDER NOTE - PATIENT PORTAL LINK FT
You can access the FollowMyHealth Patient Portal offered by [facility] by registering at the following website: [email or web address]. By joining [employer]’s FollowMyHealth portal, you will also be able to view your health information using other applications (apps) compatible with our system.

## 2019-09-14 NOTE — ED PROVIDER NOTE - NSFOLLOWUPINSTRUCTIONS_ED_ALL_ED_FT
Urinary Tract Infection    A urinary tract infection (UTI) is an infection of any part of the urinary tract, which includes the kidneys, ureters, bladder, and urethra. Risk factors include ignoring your need to urinate, wiping back to front if female, being an uncircumcised male, and having diabetes or a weak immune system. Symptoms include frequent urination, pain or burning with urination, foul smelling urine, cloudy urine, pain in the lower abdomen, blood in the urine, and fever. If you were prescribed an antibiotic medicine, take it as told by your health care provider. Do not stop taking the antibiotic even if you start to feel better.    SEEK IMMEDIATE MEDICAL CARE IF YOU HAVE ANY OF THE FOLLOWING SYMPTOMS: severe back or abdominal pain, fever, inability to keep fluids or medicine down, dizziness/lightheadedness, or a change in mental status.    Consider seeing a Urologist to discuss frequent UTIs.

## 2019-09-14 NOTE — ED PROVIDER NOTE - OBJECTIVE STATEMENT
30 y/o Female with PMHx of anxiety (on Klonopin) and UTI presents to ED c/o RLQ pain. Pt states last Saturday she felt pain in her RLQ with a burning sensation in her pelvic area prompting her to visit UC (STD and UTI test results were negative) and the symptoms eventually subsided. Pt then went to her GYN on Wednesday, where she had a pelvic exam done and US, which results were also negative. Today, pt came home from an outing with friends and symptoms had returned with associated vomiting (x3 episodes today). Denies dysuria, change in diet, fever, and chills. PTA, pt took ibuprofen for the pain. 28 y/o Female with PMHx of anxiety (on Klonopin) and UTI presents to ED c/o RLQ pain with bloating and vomiting. Pt states last Saturday she felt pain in her RLQ with a burning sensation in her pelvic area prompting her to visit UC (STD and UTI test results were negative) and the symptoms eventually subsided. Pt then went to her GYN on Wednesday, where she had a pelvic exam done and US, which results were also negative. Today, pt came home from an outing with friends and symptoms had returned with associated vomiting (x3 episodes today). Denies dysuria, constipation, change in diet, fever, and chills. PTA, pt took ibuprofen for the pain.

## 2019-09-14 NOTE — ED PROVIDER NOTE - CLINICAL SUMMARY MEDICAL DECISION MAKING FREE TEXT BOX
30 y/o Female presents to ED with suprapubic and RLQ pain. Will give pt pain relievers, order basic labs, and abdomen CT to r/o appendicitis.

## 2019-09-15 VITALS
SYSTOLIC BLOOD PRESSURE: 119 MMHG | OXYGEN SATURATION: 96 % | DIASTOLIC BLOOD PRESSURE: 75 MMHG | TEMPERATURE: 98 F | HEART RATE: 56 BPM | RESPIRATION RATE: 18 BRPM

## 2019-09-15 PROCEDURE — 74177 CT ABD & PELVIS W/CONTRAST: CPT | Mod: 26

## 2019-09-15 RX ORDER — CEFUROXIME AXETIL 250 MG
1 TABLET ORAL
Qty: 6 | Refills: 0
Start: 2019-09-15 | End: 2019-09-17

## 2019-09-15 RX ORDER — PHENAZOPYRIDINE HCL 100 MG
200 TABLET ORAL ONCE
Refills: 0 | Status: COMPLETED | OUTPATIENT
Start: 2019-09-15 | End: 2019-09-15

## 2019-09-15 RX ORDER — PHENAZOPYRIDINE HCL 100 MG
2 TABLET ORAL
Qty: 12 | Refills: 0
Start: 2019-09-15 | End: 2019-09-16

## 2019-09-15 RX ADMIN — Medication 200 MILLIGRAM(S): at 04:42

## 2019-09-17 ENCOUNTER — EMERGENCY (EMERGENCY)
Facility: HOSPITAL | Age: 29
LOS: 1 days | Discharge: HOME | End: 2019-09-17
Attending: EMERGENCY MEDICINE | Admitting: EMERGENCY MEDICINE
Payer: COMMERCIAL

## 2019-09-17 VITALS
TEMPERATURE: 98 F | SYSTOLIC BLOOD PRESSURE: 135 MMHG | WEIGHT: 139.99 LBS | HEART RATE: 84 BPM | RESPIRATION RATE: 16 BRPM | DIASTOLIC BLOOD PRESSURE: 86 MMHG | HEIGHT: 64 IN

## 2019-09-17 DIAGNOSIS — N39.0 URINARY TRACT INFECTION, SITE NOT SPECIFIED: ICD-10-CM

## 2019-09-17 DIAGNOSIS — R30.0 DYSURIA: ICD-10-CM

## 2019-09-17 LAB
-  AMIKACIN: SIGNIFICANT CHANGE UP
-  AMPICILLIN/SULBACTAM: SIGNIFICANT CHANGE UP
-  AMPICILLIN: SIGNIFICANT CHANGE UP
-  AZTREONAM: SIGNIFICANT CHANGE UP
-  CEFAZOLIN: SIGNIFICANT CHANGE UP
-  CEFEPIME: SIGNIFICANT CHANGE UP
-  CEFOXITIN: SIGNIFICANT CHANGE UP
-  CEFTRIAXONE: SIGNIFICANT CHANGE UP
-  CIPROFLOXACIN: SIGNIFICANT CHANGE UP
-  GENTAMICIN: SIGNIFICANT CHANGE UP
-  IMIPENEM: SIGNIFICANT CHANGE UP
-  LEVOFLOXACIN: SIGNIFICANT CHANGE UP
-  MEROPENEM: SIGNIFICANT CHANGE UP
-  NITROFURANTOIN: SIGNIFICANT CHANGE UP
-  PIPERACILLIN/TAZOBACTAM: SIGNIFICANT CHANGE UP
-  TIGECYCLINE: SIGNIFICANT CHANGE UP
-  TOBRAMYCIN: SIGNIFICANT CHANGE UP
-  TRIMETHOPRIM/SULFAMETHOXAZOLE: SIGNIFICANT CHANGE UP
ALBUMIN SERPL ELPH-MCNC: 3.8 G/DL — SIGNIFICANT CHANGE UP (ref 3.4–5)
ALP SERPL-CCNC: 63 U/L — SIGNIFICANT CHANGE UP (ref 40–120)
ALT FLD-CCNC: 14 U/L — SIGNIFICANT CHANGE UP (ref 12–42)
ANION GAP SERPL CALC-SCNC: 9 MMOL/L — SIGNIFICANT CHANGE UP (ref 9–16)
APPEARANCE UR: CLEAR — SIGNIFICANT CHANGE UP
AST SERPL-CCNC: 23 U/L — SIGNIFICANT CHANGE UP (ref 15–37)
BASOPHILS NFR BLD AUTO: 0.8 % — SIGNIFICANT CHANGE UP (ref 0–2)
BILIRUB SERPL-MCNC: 1.5 MG/DL — HIGH (ref 0.2–1.2)
BILIRUB UR-MCNC: ABNORMAL
BUN SERPL-MCNC: 9 MG/DL — SIGNIFICANT CHANGE UP (ref 7–23)
CALCIUM SERPL-MCNC: 9.4 MG/DL — SIGNIFICANT CHANGE UP (ref 8.5–10.5)
CHLORIDE SERPL-SCNC: 106 MMOL/L — SIGNIFICANT CHANGE UP (ref 96–108)
CO2 SERPL-SCNC: 23 MMOL/L — SIGNIFICANT CHANGE UP (ref 22–31)
COLOR SPEC: ABNORMAL
CREAT SERPL-MCNC: 0.62 MG/DL — SIGNIFICANT CHANGE UP (ref 0.5–1.3)
CULTURE RESULTS: SIGNIFICANT CHANGE UP
DIFF PNL FLD: NEGATIVE — SIGNIFICANT CHANGE UP
EOSINOPHIL NFR BLD AUTO: 2.6 % — SIGNIFICANT CHANGE UP (ref 0–6)
GLUCOSE SERPL-MCNC: 90 MG/DL — SIGNIFICANT CHANGE UP (ref 70–99)
GLUCOSE UR QL: 100
HCG UR QL: NEGATIVE — SIGNIFICANT CHANGE UP
HCT VFR BLD CALC: 39.1 % — SIGNIFICANT CHANGE UP (ref 34.5–45)
HGB BLD-MCNC: 13.8 G/DL — SIGNIFICANT CHANGE UP (ref 11.5–15.5)
IMM GRANULOCYTES NFR BLD AUTO: 0.5 % — SIGNIFICANT CHANGE UP (ref 0–1.5)
KETONES UR-MCNC: ABNORMAL MG/DL
LACTATE SERPL-SCNC: 0.8 MMOL/L — SIGNIFICANT CHANGE UP (ref 0.4–2)
LEUKOCYTE ESTERASE UR-ACNC: NEGATIVE — SIGNIFICANT CHANGE UP
LYMPHOCYTES # BLD AUTO: 32.3 % — SIGNIFICANT CHANGE UP (ref 13–44)
MCHC RBC-ENTMCNC: 30.7 PG — SIGNIFICANT CHANGE UP (ref 27–34)
MCHC RBC-ENTMCNC: 35.3 G/DL — SIGNIFICANT CHANGE UP (ref 32–36)
MCV RBC AUTO: 87.1 FL — SIGNIFICANT CHANGE UP (ref 80–100)
METHOD TYPE: SIGNIFICANT CHANGE UP
MONOCYTES NFR BLD AUTO: 7.8 % — SIGNIFICANT CHANGE UP (ref 2–14)
NEUTROPHILS NFR BLD AUTO: 56 % — SIGNIFICANT CHANGE UP (ref 43–77)
NITRITE UR-MCNC: POSITIVE
ORGANISM # SPEC MICROSCOPIC CNT: SIGNIFICANT CHANGE UP
ORGANISM # SPEC MICROSCOPIC CNT: SIGNIFICANT CHANGE UP
PH UR: 5 — SIGNIFICANT CHANGE UP (ref 5–8)
PLATELET # BLD AUTO: 248 K/UL — SIGNIFICANT CHANGE UP (ref 150–400)
POTASSIUM SERPL-MCNC: 4 MMOL/L — SIGNIFICANT CHANGE UP (ref 3.5–5.3)
POTASSIUM SERPL-SCNC: 4 MMOL/L — SIGNIFICANT CHANGE UP (ref 3.5–5.3)
PROT SERPL-MCNC: 7.4 G/DL — SIGNIFICANT CHANGE UP (ref 6.4–8.2)
PROT UR-MCNC: 30 MG/DL
RBC # BLD: 4.49 M/UL — SIGNIFICANT CHANGE UP (ref 3.8–5.2)
RBC # FLD: 11.9 % — SIGNIFICANT CHANGE UP (ref 10.3–14.5)
SODIUM SERPL-SCNC: 138 MMOL/L — SIGNIFICANT CHANGE UP (ref 132–145)
SP GR SPEC: 1.02 — SIGNIFICANT CHANGE UP (ref 1–1.03)
SPECIMEN SOURCE: SIGNIFICANT CHANGE UP
UROBILINOGEN FLD QL: 4 E.U./DL
WBC # BLD: 6.6 K/UL — SIGNIFICANT CHANGE UP (ref 3.8–10.5)
WBC # FLD AUTO: 6.6 K/UL — SIGNIFICANT CHANGE UP (ref 3.8–10.5)

## 2019-09-17 PROCEDURE — 99284 EMERGENCY DEPT VISIT MOD MDM: CPT

## 2019-09-17 RX ORDER — CEFTRIAXONE 500 MG/1
1000 INJECTION, POWDER, FOR SOLUTION INTRAMUSCULAR; INTRAVENOUS ONCE
Refills: 0 | Status: COMPLETED | OUTPATIENT
Start: 2019-09-17 | End: 2019-09-17

## 2019-09-17 RX ORDER — SODIUM CHLORIDE 9 MG/ML
1000 INJECTION INTRAMUSCULAR; INTRAVENOUS; SUBCUTANEOUS ONCE
Refills: 0 | Status: COMPLETED | OUTPATIENT
Start: 2019-09-17 | End: 2019-09-17

## 2019-09-17 RX ORDER — CEFUROXIME AXETIL 250 MG
1 TABLET ORAL
Qty: 14 | Refills: 0
Start: 2019-09-17 | End: 2019-09-23

## 2019-09-17 RX ORDER — KETOROLAC TROMETHAMINE 30 MG/ML
30 SYRINGE (ML) INJECTION ONCE
Refills: 0 | Status: DISCONTINUED | OUTPATIENT
Start: 2019-09-17 | End: 2019-09-17

## 2019-09-17 RX ADMIN — Medication 30 MILLIGRAM(S): at 20:37

## 2019-09-17 RX ADMIN — SODIUM CHLORIDE 1000 MILLILITER(S): 9 INJECTION INTRAMUSCULAR; INTRAVENOUS; SUBCUTANEOUS at 20:38

## 2019-09-17 RX ADMIN — CEFTRIAXONE 100 MILLIGRAM(S): 500 INJECTION, POWDER, FOR SOLUTION INTRAMUSCULAR; INTRAVENOUS at 20:38

## 2019-09-17 NOTE — ED ADULT NURSE REASSESSMENT NOTE - NS ED NURSE REASSESS COMMENT FT1
patient difficult stick- unable to stay still during blood draw/IV placement, crying and asking for this writer to draw blood faster or stop, unable to obtain full blood culture bottles for this reason.  Safety and comfort maintained, will continue to monitor.

## 2019-09-17 NOTE — ED PROVIDER NOTE - PATIENT PORTAL LINK FT
You can access the FollowMyHealth Patient Portal offered by [facility] by registering at the following website: [email or web address]. By joining [employer]’s FollowMyHealth portal, you will also be able to view your health information using other applications (apps) compatible with our system.

## 2019-09-17 NOTE — ED PROVIDER NOTE - OBJECTIVE STATEMENT
30 y/o pt with PMHx of PCOS and blot clotting disorder, not on medication presents with abdominal pain, pain with urination, and bloating. Pt was here 3 days ago for similar symptoms and was diagnosed with a UTI. Today finished last dose of antibiotics. Pt was seen at PCP a week prior and had a negative urine culture but was prescribed 7 days of Bactrim to treat her symptoms. Pt has been seen here for UTI on 6/25 and 9/14. Her symptoms are worsening and nothing is helping her pain. Her pain is constant but worse with urination. She notes when it's bad it is unbearable. She complains she has stomach bloating that radiates. Has chills and nausea. Has been eating and drinking normally. no NKDA. LNMP was 8/30 and longer than usual. Pt had a cyst that burst about 6 years ago. Denies fever, vomiting, diarrhea, CP, SOB.

## 2019-09-17 NOTE — ED PROVIDER NOTE - CLINICAL SUMMARY MEDICAL DECISION MAKING FREE TEXT BOX
labs reassuring, no leukocytosis, normal lactic, UA still some signs infection, new UCx sent, will continue cephalosporin as prior culture shows sensitivities to cephalosporins. Will DC w instructions, if any worsening RTER

## 2019-09-18 PROBLEM — N39.0 URINARY TRACT INFECTION, SITE NOT SPECIFIED: Chronic | Status: ACTIVE | Noted: 2019-09-15

## 2019-09-19 LAB
CULTURE RESULTS: SIGNIFICANT CHANGE UP
SPECIMEN SOURCE: SIGNIFICANT CHANGE UP

## 2019-09-23 LAB
CULTURE RESULTS: SIGNIFICANT CHANGE UP
CULTURE RESULTS: SIGNIFICANT CHANGE UP
SPECIMEN SOURCE: SIGNIFICANT CHANGE UP
SPECIMEN SOURCE: SIGNIFICANT CHANGE UP

## 2019-10-02 ENCOUNTER — EMERGENCY (EMERGENCY)
Facility: HOSPITAL | Age: 29
LOS: 1 days | Discharge: HOME | End: 2019-10-02
Admitting: EMERGENCY MEDICINE
Payer: COMMERCIAL

## 2019-10-02 VITALS
RESPIRATION RATE: 18 BRPM | HEART RATE: 85 BPM | DIASTOLIC BLOOD PRESSURE: 73 MMHG | TEMPERATURE: 98 F | WEIGHT: 149.91 LBS | SYSTOLIC BLOOD PRESSURE: 114 MMHG | OXYGEN SATURATION: 99 %

## 2019-10-02 DIAGNOSIS — N39.0 URINARY TRACT INFECTION, SITE NOT SPECIFIED: ICD-10-CM

## 2019-10-02 DIAGNOSIS — Z79.2 LONG TERM (CURRENT) USE OF ANTIBIOTICS: ICD-10-CM

## 2019-10-02 DIAGNOSIS — R30.0 DYSURIA: ICD-10-CM

## 2019-10-02 LAB
APPEARANCE UR: CLEAR — SIGNIFICANT CHANGE UP
BILIRUB UR-MCNC: NEGATIVE — SIGNIFICANT CHANGE UP
COLOR SPEC: YELLOW — SIGNIFICANT CHANGE UP
DIFF PNL FLD: ABNORMAL
GLUCOSE UR QL: 250
HCG UR QL: SIGNIFICANT CHANGE UP
KETONES UR-MCNC: NEGATIVE — SIGNIFICANT CHANGE UP
LEUKOCYTE ESTERASE UR-ACNC: ABNORMAL
NITRITE UR-MCNC: POSITIVE
PH UR: 5 — SIGNIFICANT CHANGE UP (ref 5–8)
PROT UR-MCNC: 30 MG/DL
SP GR SPEC: 1.02 — SIGNIFICANT CHANGE UP (ref 1–1.03)
UROBILINOGEN FLD QL: 4 E.U./DL

## 2019-10-02 PROCEDURE — 99283 EMERGENCY DEPT VISIT LOW MDM: CPT

## 2019-10-02 RX ORDER — NITROFURANTOIN MACROCRYSTAL 50 MG
100 CAPSULE ORAL ONCE
Refills: 0 | Status: COMPLETED | OUTPATIENT
Start: 2019-10-02 | End: 2019-10-02

## 2019-10-02 RX ORDER — PHENAZOPYRIDINE HCL 100 MG
1 TABLET ORAL
Qty: 6 | Refills: 0
Start: 2019-10-02 | End: 2019-10-03

## 2019-10-02 RX ORDER — NITROFURANTOIN MACROCRYSTAL 50 MG
1 CAPSULE ORAL
Qty: 20 | Refills: 0
Start: 2019-10-02 | End: 2019-10-11

## 2019-10-02 RX ORDER — PHENAZOPYRIDINE HCL 100 MG
200 TABLET ORAL ONCE
Refills: 0 | Status: COMPLETED | OUTPATIENT
Start: 2019-10-02 | End: 2019-10-02

## 2019-10-02 RX ADMIN — Medication 100 MILLIGRAM(S): at 15:28

## 2019-10-02 RX ADMIN — Medication 200 MILLIGRAM(S): at 15:28

## 2019-10-02 NOTE — ED PROVIDER NOTE - PHYSICAL EXAMINATION
CONSTITUTIONAL: Well-developed; well-nourished; in no acute distress.  	SKIN: Skin is warm and dry, no acute rash.  	HEAD: Normocephalic; atraumatic.  	EYES: PERRL, EOM intact; conjunctiva and sclera clear.  	ENT: No nasal discharge; airway clear.  no tonsillar swelling or exudates, uvula midline, airway patent  	NECK: Supple; non tender.  	CARD: S1, S2 normal; no murmurs, gallops, or rubs. Regular rate and rhythm.  	RESP: No wheezes, rales or rhonchi.  	ABD: soft; non-distended; non-tender. no cvat bilaterally  	EXT: Normal ROM. No clubbing, cyanosis or edema.  	NEURO: Alert, oriented. Grossly unremarkable.  PSYCH: Cooperative, appropriate.

## 2019-10-02 NOTE — ED PROVIDER NOTE - NSFOLLOWUPINSTRUCTIONS_ED_ALL_ED_FT
Drink plenty of fluids.   Take antibiotics as prescribed.   Take pyridium as needed for symptoms    A urinary tract infection (UTI) is an infection of any part of the urinary tract, which includes the kidneys, ureters, bladder, and urethra. Risk factors include ignoring your need to urinate, wiping back to front if female, being an uncircumcised male, and having diabetes or a weak immune system or not urinating  Symptoms include frequent urination, pain or burning with urination, foul smelling urine, cloudy urine, pain in the lower abdomen, blood in the urine, and fever. If you were prescribed an antibiotic medicine, take it as told by your health care provider. Do not stop taking the antibiotic even if you start to feel better    Please follow up with urology by next week.     SEEK IMMEDIATE MEDICAL CARE IF YOU HAVE ANY OF THE FOLLOWING SYMPTOMS: severe back or abdominal pain, fever, inability to keep fluids or medicine down, dizziness/lightheadedness, or a change in mental status.

## 2019-10-02 NOTE — ED PROVIDER NOTE - CLINICAL SUMMARY MEDICAL DECISION MAKING FREE TEXT BOX
hx recurrent UTIs c/o dysuria/urinary frequency, afebrile, abdomen soft nontender, no cvat bilaterally, well appearing, previous urine culture reviewed +e coli, sens to cephalosporin and macrobid, resistant to sulfa/ flouroquinolones. recently finished course of ceftin, will rx macrobid, pyridium prn, f/u urology for further workup/eval, return precautions discussed.

## 2019-10-02 NOTE — ED PROVIDER NOTE - CARE PROVIDER_API CALL
Laron Mejia)  Urology  [address], [place] [address]  Phone: [phone]  Fax: [phone]  Follow Up Time:     Saúl Robbins)  Urology  [address] [place] [id number]  Phone: [phone]  Fax: [phone]  Follow Up Time:     Giacomo Yee)  Urology  [address], [place] [address]  Phone: [phone]  Fax: [phone]  Follow Up Time:

## 2019-10-02 NOTE — ED PROVIDER NOTE - PATIENT PORTAL LINK FT
You can access the FollowMyHealth Patient Portal offered by [facility] by registering at the following website: [email or web address]. By joining [employer]s FollowMyHealth portal, you will also be able to view your health information using other applications (apps) compatible with our system.

## 2019-10-02 NOTE — ED PROVIDER NOTE - CARE PROVIDERS DIRECT ADDRESSES
[email or web address]

## 2019-10-02 NOTE — ED PROVIDER NOTE - PROVIDER TOKENS
PROVIDER:[TOKEN:[[id number]]],PROVIDER:[TOKEN:[[id number]]],PROVIDER:[TOKEN:[[id number]]]

## 2019-10-03 LAB
CULTURE RESULTS: SIGNIFICANT CHANGE UP
SPECIMEN SOURCE: SIGNIFICANT CHANGE UP

## 2019-11-29 NOTE — ED ADULT NURSE NOTE - NSFALLRSKASSESSDT_ED_ALL_ED
HISTORY OF PRESENT ILLNESS:    Patient is here for the follow-up on her mood symptoms and also medication check.  Please review my previous note for further details.  Patient has been going through significant stress currently.    Patient about that she feels a little better.  She was using Xanax for initial few days but reports that she has not used it for past 3-4 days.  Also has not been using hydroxyzine also much.  She is tolerating the fluoxetine well but denies any changes so far.  She also has not heard from behavioral health, had put her on a prior to list to get into counseling.    Since last appointment, reports that she has talked to her .  He is trying to get some help.  Patient has less crying spells now.  She is going to the work.  Feels little better but still overwhelmed.      Past Medical History:   Diagnosis Date   • Epilepsy (CMS/[facility])     last seizure 2005- stopped medication 2012- temporal lobe seizure- no gran mal- unknown cause   • Seizures (CMS/[facility])      Allergies as of 11/29/2019 - Reviewed 11/29/2019   Allergen Reaction Noted   • Shellfish allergy   (food or med) RASH 09/02/2015     Current Outpatient Medications   Medication Sig   • FLUoxetine (PROZAC) 20 MG capsule Take 1 capsule by mouth daily.   • hydrOXYzine (ATARAX) 25 MG tablet Take 1 tablet by mouth every 8 hours as needed for Anxiety.   • ibuprofen 200 MG capsule Take 3 capsules by mouth every 6 hours as needed for Pain.   • ALPRAZolam (XANAX) 0.5 MG tablet Take 1 tablet by mouth every 6 hours as needed for Anxiety.   • dicloxacillin (DYNAPEN) 500 MG capsule Take 1 capsule by mouth 4 times daily.   • Multiple Vitamins-Minerals (MULTIVITAMIN PO) Take 1 tablet by mouth daily.     No current facility-administered medications for this visit.        PAST SURGICAL HISTORY:    ------------OTHER-------------                                  Comment: left arm set x 2- anesthesia used.    SOCIAL HISTORY:  Social History     Tobacco  Use   • Smoking status: Never Smoker   • Smokeless tobacco: Never Used   Substance Use Topics   • Alcohol use: No     Alcohol/week: 0.0 standard drinks     Frequency: Never     Drinks per session: 1 or 2     Binge frequency: Never     Comment: occasionally, not when pregnant   • Drug use: No       FAMILY HISTORY:  Family History   Problem Relation Age of Onset   • Other Mother         precancerous tissue- double mastectomy late 40s   • Lung Disease Maternal Grandmother    • Parkinsonism Paternal Grandfather        REVIEW OF SYSTEMS:    All pertinent systems were reviewed and were negative except as mentioned above in HPI.       PHYSICAL EXAM:   Visit Vitals  /74   Pulse 76   Resp 16   Ht 5' 6\" (1.676 m)   Wt 54.3 kg   LMP 11/23/2019 (Exact Date)   BMI 19.34 kg/m²     GENERAL:  She is a well-developed well-nourished .female in no distress.    Psych: Pleasant, well groomed, good eye contact, normal speech, mood and affect normal, thoughts appropriate, insight normal.        ASSESSMENT/ PLAN    1. Routine general medical examination at a health care facility    - CBC WITH DIFFERENTIAL; Future  - COMPREHENSIVE METABOLIC PANEL; Future  - LIPID PANEL WITH REFLEX; Future  - URINALYSIS & REFLEX MICRO WITH CULTURE IF INDICATED; Future  - THYROID STIMULATING HORMONE REFLEX; Future      2. Severe major depression (CMS/HCC)    3. Anxiety    4. Panic attack    For above problem 2, 3 and 4, patient will continue with the fluoxetine.  She has Xanax and hydroxyzine.  I recommended to call the behavioral health today  and see if she can be on the wait list for counseling which will be very helpful for her.  Discussed other lifestyle changes.  She is talking to her  so hopefully counseling would help them.  I will see her in a month for a complete physical since she is due for one.    01-Mar-2019 20:41

## 2023-08-16 NOTE — ED PROVIDER NOTE - MEDICAL DECISION MAKING DETAILS
Quality 130: Documentation Of Current Medications In The Medical Record: Current Medications Documented
Detail Level: Detailed
Quality 431: Preventive Care And Screening: Unhealthy Alcohol Use - Screening: Patient not identified as an unhealthy alcohol user when screened for unhealthy alcohol use using a systematic screening method
Quality 226: Preventive Care And Screening: Tobacco Use: Screening And Cessation Intervention: Patient screened for tobacco use and is an ex/non-smoker
pt with etoh related n/v. given ivf's/zofran.

## 2023-09-18 NOTE — ED ADULT NURSE NOTE - NS ED NURSE LEVEL OF CONSCIOUSNESS MENTAL STATUS
Cooperative/Alert/Awake Topical Clindamycin Counseling: Patient counseled that this medication may cause skin irritation or allergic reactions.  In the event of skin irritation, the patient was advised to reduce the amount of the drug applied or use it less frequently.   The patient verbalized understanding of the proper use and possible adverse effects of clindamycin.  All of the patient's questions and concerns were addressed.

## 2024-10-14 NOTE — ED ADULT NURSE NOTE - CAS DISCH TRANSFER METHOD
Won called requesting a refill of the below medication which has been pended for you:     Requested Prescriptions     Pending Prescriptions Disp Refills    lisinopril (PRINIVIL;ZESTRIL) 40 MG tablet [Pharmacy Med Name: LISINOPRIL TABS 40MG] 90 tablet 3     Sig: TAKE 1 TABLET DAILY       Last Appointment Date: 9/13/2024  Next Appointment Date: 12/13/2024    Allergies   Allergen Reactions    Invokana [Canagliflozin] Other (See Comments)     Infection noted    Lipitor [Atorvastatin]      Muscle weakness    Metformin And Related      abd pain    Zetia [Ezetimibe] Nausea And Vomiting      walked

## 2025-05-12 NOTE — ED PROVIDER NOTE - NS ED MD DISPO DISCHARGE CCDA
Wound Care RN Note  Patient is here for RN vac dressing change     Wound Location: breast(s).    Last MD/NP visit: date 5/5/25    Patient has the following concerns today:  skin irritation around wound    Assessment: see EPIC flowsheet  Visit Vitals  LMP 03/04/2023      Wound photos: see media tab     Significant findings or changes today:  Periwound skin excoriated from drainage. Discussed with Dr. Chowdhury. Orders updated to include Duoderm to window frame wound.    Wound is cleansed and dressing(s) applied according to orders. Patient tolerated well .    Vac:  settings 125 mmHg    Education provided: dressing change and products used [employer]    Next MD visit date: 5/21   Patient/Caregiver provided printed discharge information.